# Patient Record
Sex: MALE | Race: OTHER | Employment: OTHER | URBAN - METROPOLITAN AREA
[De-identification: names, ages, dates, MRNs, and addresses within clinical notes are randomized per-mention and may not be internally consistent; named-entity substitution may affect disease eponyms.]

---

## 2018-04-20 NOTE — PATIENT DISCUSSION
pt unhappy with reading VA with [employer]. Poss candidate for [employer]fony lens exchange OS only. Hold on Yag until she gets on opinion of whether this is a candidate or not.

## 2019-07-25 NOTE — PROCEDURE NOTE: SURGICAL
<p>Prior to commencing surgery patient identification, surgical procedure, site, and side were confirmed by Dr. Radhika Moseley. Following topical proparacaine anesthesia, the patient was positioned at the YAG laser, a contact lens coupled to the cornea with methylcellulose and an axial posterior capsulotomy performed without complication using 2.7 Mj x 37. Excess methylcellulose was washed from the eye, one drop of Alphagan was instilled and the patient returned to the holding area having tolerated the procedure well and without complication. </p><p>MRN:[id number]</p>

## 2020-06-24 NOTE — PROCEDURE NOTE: CLINICAL
PROCEDURE NOTE: Laser for Lattice Degeneration OS. Diagnosis: Lattice Degeneration with Atrophic Holes. Anesthesia: Topical. Prior to laser, risks/benefits/alternatives to laser discussed including loss of vision, decreased peripheral and night vision, need for more laser and/or surgery and patient wished to proceed. Spot size: 200 um. Power: 300 mW. Number: 40. Patient tolerated procedure well. There were no complications. Post procedure instructions given. Rodri Sherwood

## 2021-03-17 ENCOUNTER — NEW PATIENT COMPREHENSIVE (OUTPATIENT)
Dept: URBAN - METROPOLITAN AREA CLINIC 39 | Facility: CLINIC | Age: 66
End: 2021-03-17

## 2021-03-17 DIAGNOSIS — H52.4: ICD-10-CM

## 2021-03-17 DIAGNOSIS — H52.223: ICD-10-CM

## 2021-03-17 DIAGNOSIS — H35.371: ICD-10-CM

## 2021-03-17 DIAGNOSIS — H40.013: ICD-10-CM

## 2021-03-17 DIAGNOSIS — H04.123: ICD-10-CM

## 2021-03-17 PROCEDURE — 92004 COMPRE OPH EXAM NEW PT 1/>: CPT

## 2021-03-17 PROCEDURE — 92015 DETERMINE REFRACTIVE STATE: CPT

## 2021-03-17 PROCEDURE — 92133 CPTRZD OPH DX IMG PST SGM ON: CPT

## 2021-03-17 ASSESSMENT — TONOMETRY
OS_IOP_MMHG: 12
OD_IOP_MMHG: 12

## 2021-03-17 ASSESSMENT — VISUAL ACUITY
OS_SC: J6
OS_CC: J3
OS_SC: 20/50
OU_SC: J2
OS_CC: 20/30
OD_SC: 20/40-1
OS_BAT: 20/40
OU_SC: 20/25-2
OD_CC: J2
OU_CC: J1+
OD_SC: J10
OU_CC: 20/20-1
OD_CC: 20/25
OD_BAT: 20/40

## 2021-03-17 ASSESSMENT — KERATOMETRY
OS_AXISANGLE_DEGREES: 91
OS_AXISANGLE2_DEGREES: 1
OS_K2POWER_DIOPTERS: 39.25
OD_AXISANGLE2_DEGREES: 27
OD_K1POWER_DIOPTERS: 38.75
OD_K2POWER_DIOPTERS: 39.00
OD_AXISANGLE_DEGREES: 117
OS_K1POWER_DIOPTERS: 38.50

## 2021-09-22 ENCOUNTER — IOP CHECK (OUTPATIENT)
Dept: URBAN - METROPOLITAN AREA CLINIC 39 | Facility: CLINIC | Age: 66
End: 2021-09-22

## 2021-09-22 DIAGNOSIS — H40.013: ICD-10-CM

## 2021-09-22 PROCEDURE — 76514 ECHO EXAM OF EYE THICKNESS: CPT

## 2021-09-22 PROCEDURE — 92250 FUNDUS PHOTOGRAPHY W/I&R: CPT

## 2021-09-22 PROCEDURE — 92012 INTRM OPH EXAM EST PATIENT: CPT

## 2021-09-22 PROCEDURE — 92083 EXTENDED VISUAL FIELD XM: CPT

## 2021-09-22 ASSESSMENT — KERATOMETRY
OS_AXISANGLE2_DEGREES: 1
OS_K2POWER_DIOPTERS: 39.25
OD_K2POWER_DIOPTERS: 39.00
OD_K1POWER_DIOPTERS: 38.75
OD_AXISANGLE2_DEGREES: 27
OS_K1POWER_DIOPTERS: 38.50
OS_AXISANGLE_DEGREES: 91
OD_AXISANGLE_DEGREES: 117

## 2021-09-22 ASSESSMENT — VISUAL ACUITY
OD_CC: 20/20
OS_CC: 20/25

## 2021-09-22 ASSESSMENT — TONOMETRY
OD_IOP_MMHG: 16
OS_IOP_MMHG: 16

## 2021-09-22 ASSESSMENT — PACHYMETRY
OS_CT_UM: 434
OD_CT_UM: 420

## 2021-11-23 NOTE — PATIENT DISCUSSION
Previously refracted to 20/20, if no improvement in [place] w/ refraction, will plan for PPV.

## 2022-01-24 NOTE — PATIENT DISCUSSION
PT IS FRUSTRATED WITH VISION OD AND READY FOR PPV WITH TB. PER TB IF VISION DOES NOT IMPROVE THEN WOULD CONSIDER SX TO HELP.

## 2022-02-22 NOTE — PATIENT DISCUSSION
The membrane is visually significant. PT C/O of distortion, interfering w/ ADL's. Will plan for PPV/MP/ICG(25G) (3/3/22).

## 2022-02-25 NOTE — PATIENT DISCUSSION
After discussion of risks, benefits, and alternatives, including loss of vision, blindness, need for additional surgery and/or retinal detachment, infection, patient elects Retinal surgery.

## 2022-02-25 NOTE — PATIENT DISCUSSION
The membrane is visually significant. PT C/O of distortion, interfering w/ driving and reading. Will plan for PPV/MP/ICG(25G) (3/3/22).

## 2022-03-01 NOTE — PATIENT DISCUSSION
Educated patient how to monitor their peripheral vision and report any changes. [FreeTextEntry1] : Stable but suboptimal control of diabetes. Will focus on lifestyle changes rather than additional meds\par negative microalbumin\par hyperlipidemia controlled\par

## 2022-03-03 NOTE — PATIENT DISCUSSION
Post-op instructions given. Discussed drops Continue Pred QID, Stop Betimol BID,  no strenuous activity or heavy lifting and eye protection. Call immediately if eye pain or loss of vision.

## 2022-03-03 NOTE — PATIENT DISCUSSION
Post Op Week 3.5 : No Eye Pain, Doing well, retina attached, good IOP with no signs of endophthalmitis.

## 2022-03-04 NOTE — PATIENT DISCUSSION
Post-op instructions given. Discussed drops Start Pred QID, Ofloxacin QID and Betimol BID (Sample given in office) , positioning No Flat on Back, OK to sleep on side , no strenuous activity or heavy lifting and eye protection. Call immediately if eye pain or loss of vision.

## 2022-03-07 NOTE — PATIENT DISCUSSION
Post-op instructions given. Discussed drops Continue Pred QID, Ofloxacin QID and Betimol BID, positioning No Flat on Back, OK to sleep on side , no strenuous activity or heavy lifting and eye protection. Call immediately if eye pain or loss of vision.

## 2022-03-11 NOTE — PATIENT DISCUSSION
Post-op instructions given. Discussed drops Continue Pred QID and Betimol BID, D/C Ofloxacin positioning No Flat on Back, OK to sleep on side , no strenuous activity or heavy lifting and eye protection. Call immediately if eye pain or loss of vision.

## 2022-03-14 NOTE — PROCEDURE NOTE: CLINICAL
PROCEDURE NOTE: Laser for Retinal Tear OD. Diagnosis: Atrophic Retinal Hole. Anesthesia: Topical. Prior to laser, risks/benefits/alternatives to laser discussed including loss of vision, decreased peripheral and night vision, need for more laser and/or surgery and patient wished to proceed. A written consent is on file, and the need for today’s laser was discussed and the patient is understanding and wishes to proceed. Laser Lens: 28. Wavelength: Argon Green. Spot size: 100 um. Pulse power: 280 mW. Number of pulses: 15. Patient tolerated procedure well. There were no complications. Post-op instructions given. Patient given office phone number/answering service number and advised to call immediately should there be loss of vision or pain, or should they have any other questions or concerns. Roseanne Zhang

## 2022-03-28 ENCOUNTER — COMPREHENSIVE EXAM (OUTPATIENT)
Dept: URBAN - METROPOLITAN AREA CLINIC 40 | Facility: CLINIC | Age: 67
End: 2022-03-28

## 2022-03-28 DIAGNOSIS — H40.013: ICD-10-CM

## 2022-03-28 DIAGNOSIS — H35.371: ICD-10-CM

## 2022-03-28 DIAGNOSIS — H52.223: ICD-10-CM

## 2022-03-28 DIAGNOSIS — H52.4: ICD-10-CM

## 2022-03-28 DIAGNOSIS — H04.123: ICD-10-CM

## 2022-03-28 PROCEDURE — 92015 DETERMINE REFRACTIVE STATE: CPT

## 2022-03-28 PROCEDURE — 92133 CPTRZD OPH DX IMG PST SGM ON: CPT

## 2022-03-28 PROCEDURE — 92014 COMPRE OPH EXAM EST PT 1/>: CPT

## 2022-03-28 ASSESSMENT — KERATOMETRY
OS_AXISANGLE2_DEGREES: 1
OS_K1POWER_DIOPTERS: 38.50
OD_AXISANGLE_DEGREES: 117
OS_AXISANGLE_DEGREES: 91
OS_K2POWER_DIOPTERS: 39.25
OD_K1POWER_DIOPTERS: 38.75
OD_K2POWER_DIOPTERS: 39.00
OD_AXISANGLE2_DEGREES: 27

## 2022-03-28 ASSESSMENT — VISUAL ACUITY
OS_CC: J1
OU_SC: 20/25-2
OU_CC: J1+
OU_CC: 20/20-2
OS_CC: 20/25
OD_SC: 20/30+2
OU_SC: J2
OS_SC: 20/30+2
OS_SC: J2
OD_CC: 20/25
OD_CC: J1
OD_SC: J6

## 2022-03-28 ASSESSMENT — TONOMETRY
OS_IOP_MMHG: 16
OD_IOP_MMHG: 16

## 2022-04-19 NOTE — PATIENT DISCUSSION
IOP Slightly elevated, Restart Betimol BID, Start to Taper Pred TID for 2 weeks, BID for 2 weeks, Qday for 2 weeks and Stop.

## 2022-04-19 NOTE — PATIENT DISCUSSION
Post-op instructions given. Discussed drops start to taper Pred TID for 2 weeks, BID for 2 weeks, Qday for 2 weeks then stop  Restart Betimol BID,  no strenuous activity or heavy lifting and eye protection. Call immediately if eye pain or loss of vision.

## 2022-04-19 NOTE — PATIENT DISCUSSION
Post Op Week 7 : No Eye Pain, Doing well, retina attached, good IOP with no signs of endophthalmitis.

## 2022-04-27 NOTE — PROCEDURE NOTE: CLINICAL
PROCEDURE NOTE: Punctal Plugs, Radha Plant ([id number], [id number]) #1 Left Lower Lid. Prior to treatment, the risks/benefits/alternatives were discussed. The patient wished to proceed with procedure. Temporary collagen plugs were inserted. Patient tolerated procedure well. There were no complications. Post procedure instructions given. Rosemary Friend PROCEDURE NOTE: Punctal Plugs, Quintess Dissolvable ([id number], [id number]) #1 Right Lower Lid. Prior to treatment, the risks/benefits/alternatives were discussed. The patient wished to proceed with procedure. Temporary collagen plugs were inserted. Patient tolerated procedure well. There were no complications. Post procedure instructions given. Rosemary Friend

## 2022-04-27 NOTE — PATIENT DISCUSSION
BLL PLUGS TODAY, DISC GEL AT NIGHT IN ADDITION AND POSS RESTASIS BUT IN THE PAST RESTASIS WAS UNSUCCESSFUL. DISC VITAL TEARS AS WELL.

## 2022-05-17 NOTE — PATIENT DISCUSSION
Post-op instructions given. Continue Pred BID, Continue Betimol BID,  no strenuous activity or heavy lifting and eye protection. Call immediately if eye pain or loss of vision.

## 2022-05-17 NOTE — PATIENT DISCUSSION
Post Op Week 11 : No Eye Pain, Doing well, retina attached, good IOP with no signs of endophthalmitis.

## 2022-09-26 ENCOUNTER — FOLLOW UP (OUTPATIENT)
Dept: URBAN - METROPOLITAN AREA CLINIC 40 | Facility: CLINIC | Age: 67
End: 2022-09-26

## 2022-09-26 DIAGNOSIS — H40.013: ICD-10-CM

## 2022-09-26 PROCEDURE — 92012 INTRM OPH EXAM EST PATIENT: CPT

## 2022-09-26 PROCEDURE — 92133 CPTRZD OPH DX IMG PST SGM ON: CPT

## 2022-09-26 ASSESSMENT — KERATOMETRY
OD_AXISANGLE_DEGREES: 117
OD_K2POWER_DIOPTERS: 39.00
OS_K2POWER_DIOPTERS: 39.25
OS_AXISANGLE2_DEGREES: 1
OS_K1POWER_DIOPTERS: 38.50
OD_K1POWER_DIOPTERS: 38.75
OD_AXISANGLE2_DEGREES: 27
OS_AXISANGLE_DEGREES: 91

## 2022-09-26 ASSESSMENT — TONOMETRY
OD_IOP_MMHG: 16
OS_IOP_MMHG: 16

## 2022-09-26 ASSESSMENT — VISUAL ACUITY
OS_CC: 20/25-2
OD_CC: 20/20-2

## 2022-10-27 NOTE — PATIENT DISCUSSION
No retinal detachment or retinal tear noted. [facility] [facility]:  Reviewed and is compliant with drug, quantity, prescriber, dispenser, and recipient history

## 2022-11-14 ENCOUNTER — TECH ONLY (OUTPATIENT)
Dept: URBAN - METROPOLITAN AREA CLINIC 39 | Facility: CLINIC | Age: 67
End: 2022-11-14

## 2022-11-14 DIAGNOSIS — H40.013: ICD-10-CM

## 2022-11-14 PROCEDURE — 92083 EXTENDED VISUAL FIELD XM: CPT

## 2022-11-14 PROCEDURE — 99211T TECH SERVICE

## 2022-11-14 ASSESSMENT — KERATOMETRY
OD_AXISANGLE2_DEGREES: 27
OS_AXISANGLE_DEGREES: 91
OS_AXISANGLE2_DEGREES: 1
OS_K1POWER_DIOPTERS: 38.50
OD_K1POWER_DIOPTERS: 38.75
OS_K2POWER_DIOPTERS: 39.25
OD_AXISANGLE_DEGREES: 117
OD_K2POWER_DIOPTERS: 39.00

## 2022-12-19 ENCOUNTER — COMPREHENSIVE EXAM (OUTPATIENT)
Dept: URBAN - METROPOLITAN AREA CLINIC 40 | Facility: CLINIC | Age: 67
End: 2022-12-19

## 2022-12-19 PROCEDURE — 92014 COMPRE OPH EXAM EST PT 1/>: CPT

## 2022-12-19 PROCEDURE — 92015 DETERMINE REFRACTIVE STATE: CPT

## 2022-12-19 PROCEDURE — 92133 CPTRZD OPH DX IMG PST SGM ON: CPT

## 2022-12-19 PROCEDURE — 92499OP2 OPTOMAP RETINAL SCREENING BOTH EYES

## 2022-12-19 ASSESSMENT — VISUAL ACUITY
OS_SC: J5
OS_CC: J1
OU_SC: 20/25-2
OU_CC: J1+
OS_CC: 20/25-2
OU_CC: 20/20
OD_CC: 20/20
OD_SC: J6
OD_SC: 20/30-2
OD_CC: J1
OS_SC: 20/30-2
OU_SC: J3

## 2022-12-19 ASSESSMENT — KERATOMETRY
OS_K2POWER_DIOPTERS: 39.25
OD_AXISANGLE_DEGREES: 117
OD_K1POWER_DIOPTERS: 38.75
OD_AXISANGLE2_DEGREES: 27
OS_K1POWER_DIOPTERS: 38.50
OD_K2POWER_DIOPTERS: 39.00
OS_AXISANGLE_DEGREES: 91
OS_AXISANGLE2_DEGREES: 1

## 2022-12-19 ASSESSMENT — TONOMETRY
OD_IOP_MMHG: 16
OS_IOP_MMHG: 16

## 2023-02-06 NOTE — PATIENT DISCUSSION
Incoming Call:  2/6/2023    [facility] received a Teams message from Renaldo Bacon,  at [facility]  She asked if [facility] was aware why pt did not have a secondary coverage  She states pt's insurance has been denying coverage for services  [facility] informed pt does have secondary coverage which is listed in his chart however does not have Medicare Part B  [facility] updated her on issues pt and his wife have been having with Medicare Part B  She did inform she will speak with her supervisor to discuss removing medical bills from collections and also assist pt in seeking financial assistance for medical bills from [facility]  Some time after initial call from [address] she did reach back out to [facility] and informed she reached out to collection agencies and requested all bills be removed from collections and sent pt a michelle care application to be completed  She also stated that pt and his wife will not receive any further bills until issue is resolved  [facility] thanked her for her time  CMOC to f/u  Next outreach is scheduled for 2/10/2023  The patient is status post Yag laser Capsulotomy in the left eye. The vision shows nice improvement.

## 2024-02-15 ENCOUNTER — COMPREHENSIVE EXAM (OUTPATIENT)
Dept: URBAN - METROPOLITAN AREA CLINIC 39 | Facility: CLINIC | Age: 69
End: 2024-02-15

## 2024-02-15 DIAGNOSIS — H40.013: ICD-10-CM

## 2024-02-15 DIAGNOSIS — H25.813: ICD-10-CM

## 2024-02-15 DIAGNOSIS — H52.4: ICD-10-CM

## 2024-02-15 DIAGNOSIS — H35.373: ICD-10-CM

## 2024-02-15 DIAGNOSIS — H04.123: ICD-10-CM

## 2024-02-15 DIAGNOSIS — H43.813: ICD-10-CM

## 2024-02-15 DIAGNOSIS — H52.223: ICD-10-CM

## 2024-02-15 PROCEDURE — 92014 COMPRE OPH EXAM EST PT 1/>: CPT

## 2024-02-15 PROCEDURE — 92250E RETINAL SCREENING, ELECTIVE

## 2024-02-15 PROCEDURE — 92015 DETERMINE REFRACTIVE STATE: CPT

## 2024-02-15 ASSESSMENT — KERATOMETRY
OD_K2POWER_DIOPTERS: 39.00
OD_K1POWER_DIOPTERS: 38.75
OS_K1POWER_DIOPTERS: 38.50
OS_AXISANGLE_DEGREES: 91
OS_AXISANGLE2_DEGREES: 1
OS_K2POWER_DIOPTERS: 39.25
OD_AXISANGLE2_DEGREES: 27
OD_AXISANGLE_DEGREES: 117

## 2024-02-15 ASSESSMENT — VISUAL ACUITY
OS_SC: J6
OD_SC: 20/20-1
OD_CC: J1
OS_CC: 20/20-2
OS_SC: 20/20
OD_SC: J10
OS_CC: J3
OD_CC: 20/20

## 2024-02-15 ASSESSMENT — TONOMETRY
OD_IOP_MMHG: 13
OS_IOP_MMHG: 13

## 2025-02-20 ENCOUNTER — COMPREHENSIVE EXAM (OUTPATIENT)
Age: 70
End: 2025-02-20

## 2025-02-20 DIAGNOSIS — H43.813: ICD-10-CM

## 2025-02-20 DIAGNOSIS — H52.4: ICD-10-CM

## 2025-02-20 DIAGNOSIS — H40.013: ICD-10-CM

## 2025-02-20 DIAGNOSIS — H52.223: ICD-10-CM

## 2025-02-20 DIAGNOSIS — H35.373: ICD-10-CM

## 2025-02-20 DIAGNOSIS — H04.123: ICD-10-CM

## 2025-02-20 DIAGNOSIS — H25.813: ICD-10-CM

## 2025-02-20 PROCEDURE — 92250E RETINAL SCREENING, ELECTIVE

## 2025-02-20 PROCEDURE — 92014 COMPRE OPH EXAM EST PT 1/>: CPT

## 2025-02-20 PROCEDURE — 92015 DETERMINE REFRACTIVE STATE: CPT

## 2025-04-14 ENCOUNTER — CONTACT LENSES/GLASSES VISIT (OUTPATIENT)
Age: 70
End: 2025-04-14

## 2025-04-14 DIAGNOSIS — H35.373: ICD-10-CM

## 2025-04-14 DIAGNOSIS — H35.81: ICD-10-CM

## 2025-04-14 PROCEDURE — 92012 INTRM OPH EXAM EST PATIENT: CPT

## 2025-04-14 PROCEDURE — 92134 CPTRZ OPH DX IMG PST SGM RTA: CPT | Mod: 25

## 2025-04-14 RX ORDER — KETOROLAC TROMETHAMINE 5 MG/ML
1 SOLUTION OPHTHALMIC
Start: 2025-04-14

## 2025-04-28 ENCOUNTER — FOLLOW UP (OUTPATIENT)
Age: 70
End: 2025-04-28

## 2025-04-28 DIAGNOSIS — H35.373: ICD-10-CM

## 2025-04-28 DIAGNOSIS — H35.81: ICD-10-CM

## 2025-04-28 PROCEDURE — 92134 CPTRZ OPH DX IMG PST SGM RTA: CPT | Mod: 25

## 2025-04-28 PROCEDURE — 92012 INTRM OPH EXAM EST PATIENT: CPT
